# Patient Record
Sex: MALE | Race: BLACK OR AFRICAN AMERICAN | NOT HISPANIC OR LATINO | URBAN - NONMETROPOLITAN AREA
[De-identification: names, ages, dates, MRNs, and addresses within clinical notes are randomized per-mention and may not be internally consistent; named-entity substitution may affect disease eponyms.]

---

## 2021-07-19 ENCOUNTER — IMPORTED ENCOUNTER (OUTPATIENT)
Dept: URBAN - NONMETROPOLITAN AREA CLINIC 1 | Facility: CLINIC | Age: 71
End: 2021-07-19

## 2021-07-19 PROBLEM — H52.4: Noted: 2021-07-19

## 2021-07-19 PROBLEM — H25.813: Noted: 2021-07-19

## 2021-07-19 PROBLEM — H40.023: Noted: 2021-07-19

## 2021-07-19 PROCEDURE — 92004 COMPRE OPH EXAM NEW PT 1/>: CPT

## 2021-07-19 PROCEDURE — 92015 DETERMINE REFRACTIVE STATE: CPT

## 2021-07-19 NOTE — PATIENT DISCUSSION
Cataract OU-Not yet surgical. -Reviewed symptoms of advancing cataract growth such as glare and halos and decreased vision.-Continue to monitor for now. Pt will notify us if any new symptoms develop. POAGS IOP 21 OU 07/19/21Order OCT ONH and Pach and VF at next visit Presbyopia-Discussed diagnosis with patient. Updated spec Rx given.   Recommend lens that will provide comfort as well as protect safety and health of eyes.; Dr's Notes: Dr. Nellie Oquendo MD

## 2022-04-10 ASSESSMENT — VISUAL ACUITY
OD_SC: 20/20
OS_SC: 20/30
OS_GLARE: 20/25
OD_GLARE: 20/50

## 2022-04-10 ASSESSMENT — TONOMETRY
OS_IOP_MMHG: 21
OD_IOP_MMHG: 21